# Patient Record
Sex: FEMALE | Race: WHITE | NOT HISPANIC OR LATINO | Employment: OTHER | URBAN - METROPOLITAN AREA
[De-identification: names, ages, dates, MRNs, and addresses within clinical notes are randomized per-mention and may not be internally consistent; named-entity substitution may affect disease eponyms.]

---

## 2019-06-06 ENCOUNTER — OFFICE VISIT (OUTPATIENT)
Dept: RETAIL CLINIC | Facility: CLINIC | Age: 44
End: 2019-06-06

## 2019-06-06 VITALS
DIASTOLIC BLOOD PRESSURE: 60 MMHG | SYSTOLIC BLOOD PRESSURE: 90 MMHG | HEART RATE: 116 BPM | TEMPERATURE: 98.6 F | OXYGEN SATURATION: 97 %

## 2019-06-06 DIAGNOSIS — B02.9 HERPES ZOSTER WITHOUT COMPLICATION: Primary | ICD-10-CM

## 2019-06-06 PROCEDURE — 99203 OFFICE O/P NEW LOW 30 MIN: CPT | Performed by: NURSE PRACTITIONER

## 2019-06-06 RX ORDER — VALACYCLOVIR HYDROCHLORIDE 1 G/1
1000 TABLET, FILM COATED ORAL 3 TIMES DAILY
Qty: 21 TABLET | Refills: 0 | Status: SHIPPED | OUTPATIENT
Start: 2019-06-06 | End: 2019-06-06

## 2019-06-06 RX ORDER — ACYCLOVIR 400 MG/1
800 TABLET ORAL
Qty: 35 TABLET | Refills: 0 | Status: SHIPPED | OUTPATIENT
Start: 2019-06-06

## 2019-06-06 NOTE — PROGRESS NOTES
CRYSTAL Hernandez is a 43 y.o. female.   Chief Complaint   Patient presents with   • Rash     painful      History of Present Illness   Presents with a painful rash on left flank area and another clump of bumps on her coccyx area between her buttocks. She states the areas burn, itch and are painful and she fears it is the shingles. She is taking IBU for pain. She has had one other episode of shingles in the past.   The following portions of the patient's history were reviewed and updated as appropriate: allergies, current medications, past family history, past medical history, past social history, past surgical history and problem list.    Current Outpatient Medications:   •  ipratropium-albuterol (COMBIVENT RESPIMAT)  MCG/ACT inhaler, Inhale 1 puff 4 (Four) Times a Day As Needed for Wheezing., Disp: , Rfl:   •  acyclovir (ZOVIRAX) 400 MG tablet, Take 2 tablets by mouth 5 (Five) Times a Day. Take no more than 5 doses a day., Disp: 35 tablet, Rfl: 0    No Known Allergies    Review of Systems   Constitutional: Positive for fatigue.   Respiratory: Negative.    Cardiovascular: Negative.    Musculoskeletal: Positive for myalgias.   Skin: Positive for color change and rash.   Allergic/Immunologic: Negative.    Neurological: Negative.    Hematological: Negative.    Psychiatric/Behavioral: Negative.        Objective     Visit Vitals  BP 90/60   Pulse 116   Temp 98.6 °F (37 °C)   SpO2 97%         Physical Exam   Constitutional: She is oriented to person, place, and time. Vital signs are normal. She appears well-developed and well-nourished. She is cooperative. She is easily aroused.  Non-toxic appearance. She does not have a sickly appearance. She does not appear ill. No distress.   HENT:   Head: Normocephalic and atraumatic.   Neurological: She is alert, oriented to person, place, and time and easily aroused.   Skin: Skin is warm, dry and intact. Rash noted. Rash is vesicular. There is erythema.         Red clump of vesicles on the left flank area and another area between the buttocks at the top of the coccyx.   Psychiatric: She has a normal mood and affect. Her behavior is normal. Judgment and thought content normal.   Nursing note and vitals reviewed.      Lab Results (last 24 hours)     ** No results found for the last 24 hours. **          Assessment/Plan   Inna was seen today for rash.    Diagnoses and all orders for this visit:    Herpes zoster without complication  -     Discontinue: valACYclovir (VALTREX) 1000 MG tablet; Take 1 tablet by mouth 3 (Three) Times a Day for 7 days.  -     acyclovir (ZOVIRAX) 400 MG tablet; Take 2 tablets by mouth 5 (Five) Times a Day. Take no more than 5 doses a day.       mg. Po every 6 hours prn pain.   Cool compresses prn pain.   Benadryl cream to area prn itching.    Jeanne Blackwell, BECK

## 2019-06-06 NOTE — PATIENT INSTRUCTIONS
Shingles  Shingles is an infection. It gives you a painful skin rash and blisters that have fluid in them. Shingles is caused by the same germ (virus) that causes chickenpox.  Shingles only happens in people who:  · Have had chickenpox.  · Have been given a shot of medicine (vaccine) to protect against chickenpox. Shingles is rare in this group.    The first symptoms of shingles may be itching, tingling, or pain in an area on your skin. A rash will show on your skin a few days or weeks later. The rash is likely to be on one side of your body. The rash usually has a shape like a belt or a band. Over time, the rash turns into fluid-filled blisters. The blisters will break open, change into scabs, and dry up. Medicines may:  · Help with pain and itching.  · Help you get better sooner.  · Help to prevent long-term problems.    Follow these instructions at home:  Medicines  · Take over-the-counter and prescription medicines only as told by your doctor.  · Put on an anti-itch cream or numbing cream where you have a rash, blisters, or scabs. Do this as told by your doctor.  Helping with itching and discomfort  · Put cold, wet cloths (cold compresses) on the area of the rash or blisters as told by your doctor.  · Cool baths can help you feel better. Try adding baking soda or dry oatmeal to the water to lessen itching. Do not bathe in hot water.  Blister and rash care  · Keep your rash covered with a loose bandage (dressing).  · Wear loose clothing that does not rub on your rash.  · Keep your rash and blisters clean. To do this, wash the area with mild soap and cool water as told by your doctor.  · Check your rash every day for signs of infection. Check for:  ? More redness, swelling, or pain.  ? Fluid or blood.  ? Warmth.  ? Pus or a bad smell.  · Do not scratch your rash. Do not pick at your blisters. To help you to not scratch:  ? Keep your fingernails clean and cut short.  ? Wear gloves or mittens when you sleep, if  scratching is a problem.  General instructions  · Rest as told by your doctor.  · Keep all follow-up visits as told by your doctor. This is important.  · Wash your hands often with soap and water. If soap and water are not available, use hand . Doing this lowers your chance of getting a skin infection caused by germs (bacteria).  · Your infection can cause chickenpox in people who have never had chickenpox or never got a shot of chickenpox vaccine. If you have blisters that did not change into scabs yet, try not to touch other people or be around other people, especially:  ? Babies.  ? Pregnant women.  ? Children who have areas of red, itchy, or rough skin (eczema).  ? Very old people who have transplants.  ? People who have a long-term (chronic) sickness, like cancer or AIDS.  Contact a doctor if:  · Your pain does not get better with medicine.  · Your pain does not get better after the rash heals.  · You have any signs of infection in the rash area. These signs include:  ? More redness, swelling, or pain around the rash.  ? Fluid or blood coming from the rash.  ? The rash area feeling warm to the touch.  ? Pus or a bad smell coming from the rash.  Get help right away if:  · The rash is on your face or nose.  · You have pain in your face or pain by your eye.  · You lose feeling on one side of your face.  · You have trouble seeing.  · You have ear pain, or you have ringing in your ear.  · You have a loss of taste.  · Your condition gets worse.  Summary  · Shingles gives you a painful skin rash and blisters that have fluid in them.  · Shingles is an infection. It is caused by the same germ (virus) that causes chickenpox.  · Keep your rash covered with a loose bandage (dressing). Wear loose clothing that does not rub on your rash.  · If you have blisters that did not change into scabs yet, try not to touch other people or be around people.  This information is not intended to replace advice given to you by  your health care provider. Make sure you discuss any questions you have with your health care provider.  Document Released: 06/05/2009 Document Revised: 08/22/2018 Document Reviewed: 08/22/2018  [employer] Interactive Patient Education © 2019 [employer]